# Patient Record
Sex: MALE | Race: WHITE | NOT HISPANIC OR LATINO | Employment: STUDENT | URBAN - METROPOLITAN AREA
[De-identification: names, ages, dates, MRNs, and addresses within clinical notes are randomized per-mention and may not be internally consistent; named-entity substitution may affect disease eponyms.]

---
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
## 2024-06-14 ENCOUNTER — TELEPHONE (OUTPATIENT)
Dept: INTERNAL MEDICINE | Facility: CLINIC | Age: 19
End: 2024-06-14
Payer: COMMERCIAL

## 2024-06-14 NOTE — TELEPHONE ENCOUNTER
Left Voicemail with Family Member (1st Attempt) for the patient to call back and schedule the following:    Appointment type: EPP  Provider: PCP  Return date: 12/15 or later    Additonal Notes: LVM for caregiver

## 2024-06-19 ENCOUNTER — TELEPHONE (OUTPATIENT)
Dept: INTERNAL MEDICINE | Facility: CLINIC | Age: 19
End: 2024-06-19
Payer: COMMERCIAL

## 2024-06-19 NOTE — TELEPHONE ENCOUNTER
Left Voicemail with Family Member (2nd Attempt) for the patient to call back and schedule the following:    Appointment type: EPP  Provider: PCP  Return date: 12/15 or later    Additonal Notes: LVM for caregiver

## 2024-10-08 ENCOUNTER — OFFICE VISIT (OUTPATIENT)
Dept: INTERNAL MEDICINE | Facility: CLINIC | Age: 19
End: 2024-10-08
Payer: COMMERCIAL

## 2024-10-08 VITALS
SYSTOLIC BLOOD PRESSURE: 121 MMHG | BODY MASS INDEX: 20.37 KG/M2 | HEART RATE: 81 BPM | OXYGEN SATURATION: 97 % | DIASTOLIC BLOOD PRESSURE: 80 MMHG | WEIGHT: 149.1 LBS

## 2024-10-08 DIAGNOSIS — Z00.00 ENCOUNTER FOR PREVENTIVE CARE: ICD-10-CM

## 2024-10-08 DIAGNOSIS — R17 TOTAL BILIRUBIN, ELEVATED: ICD-10-CM

## 2024-10-08 DIAGNOSIS — F41.9 ANXIETY: Primary | ICD-10-CM

## 2024-10-08 DIAGNOSIS — B07.0 PLANTAR WARTS: ICD-10-CM

## 2024-10-08 PROCEDURE — 99204 OFFICE O/P NEW MOD 45 MIN: CPT | Performed by: NURSE PRACTITIONER

## 2024-10-08 RX ORDER — BUPROPION HYDROCHLORIDE 150 MG/1
150 TABLET ORAL EVERY MORNING
Qty: 30 TABLET | Refills: 2 | Status: SHIPPED | OUTPATIENT
Start: 2024-10-08

## 2024-10-08 ASSESSMENT — ANXIETY QUESTIONNAIRES
GAD7 TOTAL SCORE: 15
5. BEING SO RESTLESS THAT IT IS HARD TO SIT STILL: SEVERAL DAYS
4. TROUBLE RELAXING: MORE THAN HALF THE DAYS
GAD7 TOTAL SCORE: 15
8. IF YOU CHECKED OFF ANY PROBLEMS, HOW DIFFICULT HAVE THESE MADE IT FOR YOU TO DO YOUR WORK, TAKE CARE OF THINGS AT HOME, OR GET ALONG WITH OTHER PEOPLE?: VERY DIFFICULT
GAD7 TOTAL SCORE: 15
2. NOT BEING ABLE TO STOP OR CONTROL WORRYING: NEARLY EVERY DAY
3. WORRYING TOO MUCH ABOUT DIFFERENT THINGS: NEARLY EVERY DAY
IF YOU CHECKED OFF ANY PROBLEMS ON THIS QUESTIONNAIRE, HOW DIFFICULT HAVE THESE PROBLEMS MADE IT FOR YOU TO DO YOUR WORK, TAKE CARE OF THINGS AT HOME, OR GET ALONG WITH OTHER PEOPLE: VERY DIFFICULT
6. BECOMING EASILY ANNOYED OR IRRITABLE: MORE THAN HALF THE DAYS
7. FEELING AFRAID AS IF SOMETHING AWFUL MIGHT HAPPEN: SEVERAL DAYS
1. FEELING NERVOUS, ANXIOUS, OR ON EDGE: NEARLY EVERY DAY
7. FEELING AFRAID AS IF SOMETHING AWFUL MIGHT HAPPEN: SEVERAL DAYS

## 2024-10-08 ASSESSMENT — PATIENT HEALTH QUESTIONNAIRE - PHQ9
10. IF YOU CHECKED OFF ANY PROBLEMS, HOW DIFFICULT HAVE THESE PROBLEMS MADE IT FOR YOU TO DO YOUR WORK, TAKE CARE OF THINGS AT HOME, OR GET ALONG WITH OTHER PEOPLE: SOMEWHAT DIFFICULT
SUM OF ALL RESPONSES TO PHQ QUESTIONS 1-9: 9
SUM OF ALL RESPONSES TO PHQ QUESTIONS 1-9: 9

## 2024-10-08 NOTE — PROGRESS NOTES
Assessment and Plan   Mr. Huddleston is a 19 year old male with history of anxiety seen to [facility] care.      (F41.9) Anxiety  (primary encounter diagnosis)  Comment:  Severe, primarily social in nature  Plan: Adult Mental Health  Referral  Start BuPROPion (WELLBUTRIN XL) 150 MG 24 hr tablet       Instructed on use, adverse effects  Follow-up in 1 month or sooner if needed   Advised on healthy lifestyle behaviors, sleep, exercise  TSH with free T4 reflex    (Z00.00) Encounter for preventive care  Plan:  CBC with platelets,   Comprehensive metabolic panel (BMP + Alb, Alk Phos, ALT, AST, Total. Bili, TP),   Lipid panel reflex to direct LDL Fasting,   Vitamin D Deficiency    (B07.0) Plantar warts  - S/p 3 cycles of freeze/thaw with liquid nitrogen cryotherapy.  Patient was educated on potential side effects versus benefits, instructed to monitor closely in the following days for possible wounds.  - RTC as needed if not improved      Most Recent Immunizations   Administered Date(s) Administered    COVID-19 MONOVALENT 12+ (Pfizer) 12/18/2021    DTaP, Unspecified 04/22/2009    Flu, Unspecified 10/16/2008    HIB, Unspecified 04/18/2005    HepA, Unspecified 11/15/2006    HepB, Unspecified 2004    MMR 04/22/2009    Pneumococcal (PCV 7) 07/25/2005    Poliovirus, inactivated (IPV) 04/22/2009    Varicella 04/22/2009       Return to clinic/Follow-up:  As needed and with no improvement, worsening, or new symptom development.     Options for treatment and follow-up care were reviewed with the patient. He engaged in the decision making process and verbalized understanding of the options discussed and agreed with the final plan.    I spent a total of 45 minutes in the care of this pt today, including time prior to, during, and following today's office visit. This time includes reviewing the patient's chart and prior history, external notes, obtaining a history, performing an examination, interpreting test results, and  "evaluation and counseling the patient. This time also includes ordering medications or tests necessary in addition to communication to other member's of the patient's health care team. Time spent in documentation and care coordination is included.    Jocelyn Du, ANP, AGACNP, APRN, CNP  Nurse Practitioner    __________________________    Subjective   Presenting Concern:    Mr. Huddleston is a 19 year old male with history of anxiety who presents to [facility] care.    Anxiety:   Longstanding anxiety difficulties, \"as long as he can remember.\"  He's never had medication treatment and has not had a formal diagnosis. Getting therapy through [facility] student assistance office.   Feels he overthinks things, social interactions are difficult.  Worries about his future. Hard to be around people, hold conversations  No current SI/SH thoughts, though admits to having suicidal ideation last spring and summer. No suicide attempts, no mental health hospitalizations.    Plantar Wart:  R great toe plantar wart.    Located on the right big toe, approx 0.25 cm in size. Not improved with Compound W. Treated 12/14/23 liquid nitrogen cryotherapy  - S/p 3 cycles of freeze/thaw with liquid nitrogen cryotherapy.  Patient was educated on potential side effects versus benefits, instructed to monitor closely in the following days for possible wounds.  - DESTRUCT BENIGN LESION, UP TO 14  - RTC as needed if not improved    Past Medical History:  No past medical history on file.    Active Meds:  No current outpatient medications on file.     No current facility-administered medications for this visit.        Allergies:  Reviewed, refer to EMR    Relevant Social History:  Living Arrangement:  Lives with roommate  Employment:  [employer] major, doing well academically  Relationship/Children:  Girlfriend, mother, stepfather, father - all supportive  Sexual Health:  None  Diet/Nutrition:  Healthy  Supplements:  None  Caffeine:  1 cup/day  Alcohol:  " None  Tobacco/Vaping:   None  Cannabis:  None  Street Drugs:  None  Exercise/Activity:  Works out, walks, runs  Sleep:  Takes awhile, gets 8 hrs    Review of Systems - negative except as document in HPI        Objective    /80 (BP Location: Left arm, Patient Position: Sitting, Cuff Size: Adult Regular)   Pulse 81   Wt 67.6 kg (149 lb 1.6 oz)   SpO2 97%   BMI 20.37 kg/m      Physical Exam   General appearance: alert, well appearing, and in no distress  Eyes: extra-ocular movements intact, pupils equally round and reactive bilaterally, no scleral icterus  Neck: no significant adenopathy, lymphadenopathy  Respiratory: Good air movement bilaterally, lungs clear  Cardiovascular: normal rate and regular rhythm, S1 and S2 normal, no murmurs, rubs or gallops, peripheral pulses full/symmetric, no peripheral edema  GI: Abdomen soft, bowel sounds present, nondistended, nontender  Musculoskeletal: No obvious joint deformity, swelling. Normal muscle tone, normal gait  Neurological: alert, oriented, normal speech, no focal findings or movement disorder noted, neck supple without rigidity, cranial nerves II through XII grossly intact, motor and sensory grossly normal bilaterally, normal muscle tone, no tremors, strength 5/5 throughout, sensation to light touch intact  Skin: R great toe with plantar wart, 0.25 cm, non-inflamed   Psychiatric: normal mentation, affect and mood    Answers submitted by the patient for this visit:  Patient Health Questionnaire (Submitted on 10/8/2024)  If you checked off any problems, how difficult have these problems made it for you to do your work, take care of things at home, or get along with other people?: Somewhat difficult  PHQ9 TOTAL SCORE: 9  Patient Health Questionnaire (G7) (Submitted on 10/8/2024)  LEELA 7 TOTAL SCORE: 15  Depression / Anxiety Questionnaire (Submitted on 10/8/2024)  Chief Complaint: Chronic problems general questions HPI Form  Depression/Anxiety: Depression &  Anxiety  Depression & Anxiety (Submitted on 10/8/2024)  Chief Complaint: Chronic problems general questions HPI Form  Status since last visit:: medium  Anxiety since last: : bad  Other associated symptoms of depression:: Yes  Other associated symotome: : Yes  Significant life event: : relationship concerns, job concerns, other  Anxious:: Yes  Current substance use:: No  General Questionnaire (Submitted on 10/8/2024)  Chief Complaint: Chronic problems general questions HPI Form  How many days per week do you miss taking your medication?: 0

## 2024-10-08 NOTE — PROGRESS NOTES
Jodie Schrader is a 19 year old, presenting for the following health issues:       Objective    /80 (BP Location: Left arm, Patient Position: Sitting, Cuff Size: Adult Regular)   Pulse 81   Wt 67.6 kg (149 lb 1.6 oz)   SpO2 97%   BMI 20.37 kg/m    Body mass index is 20.37 kg/m .    Signed Electronically by: Jocelyn Du NP    Answers submitted by the patient for this visit:  Patient Health Questionnaire (Submitted on 10/8/2024)  If you checked off any problems, how difficult have these problems made it for you to do your work, take care of things at home, or get along with other people?: Somewhat difficult  PHQ9 TOTAL SCORE: 9  Patient Health Questionnaire (G7) (Submitted on 10/8/2024)  LEELA 7 TOTAL SCORE: 15  Depression / Anxiety Questionnaire (Submitted on 10/8/2024)  Chief Complaint: Chronic problems general questions HPI Form  Depression/Anxiety: Depression & Anxiety  Depression & Anxiety (Submitted on 10/8/2024)  Chief Complaint: Chronic problems general questions HPI Form  Status since last visit:: medium  Anxiety since last: : bad  Other associated symptoms of depression:: Yes  Other associated symotome: : Yes  Significant life event: : relationship concerns, job concerns, other  Anxious:: Yes  Current substance use:: No  General Questionnaire (Submitted on 10/8/2024)  Chief Complaint: Chronic problems general questions HPI Form  How many days per week do you miss taking your medication?: 0

## 2024-10-09 NOTE — PATIENT INSTRUCTIONS
CRISIS LINES/SERVICES  If in Immediate danger please go to the ER and/or call 9-1-1  Feeling overwhelmed and just need a supportive person to talk through a struggling time?   Toll Free [phone] or text  Support  to 62742 (hours 12 PM - 10 PM CST)  The [place] Warmline provides a [id number] approach to mental health recovery, support and wellness. Calls are answered by professionally trained Certified Peer Specialists, who have first hand experience living with a mental health condition. (hours 12 PM - 10 PM CST)    Do you feel like you might be in crisis and potentially need professional services?   National Crisis Lines:  3-452-CMEMFTU (1-947.779.1674) - www.Traxpay  8-705-905-TALK (4567) - www.suicidepreventionlifeline.org  [place]:  Crisis Text Line: Text [place] to 306584. Free support at your fingertips 24/7  People who text [place] to 521286 will be connected with a counselor. Crisis Text Line is available 24 hours a day, seven days a week.  [facility] Multilingual Crisis Line:  [phone]  [facility]:  COPE - Adult (psychiatric crisis, but cannot transport self): [phone]   COPE - Child: [phone]  Acute Psychiatric Services - [facility] (24 hour crisis walk-in and crisis line): [phone]  [address] [place] [address]  [facility] (Emergency Psychiatric Evaluation): [phone]  [facility]:  [facility] Adult Crisis Line (crisis counseling and walk-in urgent care mental health): [phone]     Adult Residential Crisis Centers:     [employer] operates two Adult Residential Crisis Centers in the area: [facility] ([place]) and [facility] ([place])  These facilities are a step below in-patient hospital care, providing a three to ten-day stay for individuals who are at a moderate but not a high risk for self-harm. The crisis centers and other [employer] programs can be reached through their central screening at  [phone].    [facility], [address], [address], [place]  [phone] (p), [phone] (f)  Intakes only between 8am-6pm    [facility] ([place]), [address], [place]  [phone] (p)  All counties with [place]. [place] w/o ins    [facility], [address], [place]  [phone] (p), [phone] (f)  All counties with ins. [place] w/o ins     Domestic Violence:  [place] Domestic Violence Crisis Line (24-hour [place] crisis line) 4-737-634-7421    Emergency Shelters    [facility] 2-1-1  [email or web address].[address] [place] [address].org/  288-025-6951 or call 2-1-1  Free and Confidential Health and Human Services Information available 24/7. Connects individuals and families directly to organizations that can help with shelter, clothing, food, financial assistance, and many other resources.    [facility] Shelter Hotline: 1-232.191.4431  Provides information about emergency shelter and transitional housing for single adults, families, and youth in the [facility] area.    [facility], [phone] eli

## 2024-10-12 ENCOUNTER — LAB (OUTPATIENT)
Dept: LAB | Facility: CLINIC | Age: 19
End: 2024-10-12
Payer: COMMERCIAL

## 2024-10-12 DIAGNOSIS — Z11.59 NEED FOR HEPATITIS C SCREENING TEST: Primary | ICD-10-CM

## 2024-10-12 DIAGNOSIS — Z00.00 ENCOUNTER FOR PREVENTIVE CARE: ICD-10-CM

## 2024-10-12 LAB
ALBUMIN SERPL BCG-MCNC: 4.8 G/DL (ref 3.5–5.2)
ALP SERPL-CCNC: 101 U/L (ref 40–150)
ALT SERPL W P-5'-P-CCNC: 22 U/L (ref 0–70)
ANION GAP SERPL CALCULATED.3IONS-SCNC: 10 MMOL/L (ref 7–15)
AST SERPL W P-5'-P-CCNC: 22 U/L (ref 0–45)
BILIRUB SERPL-MCNC: 2.4 MG/DL
BUN SERPL-MCNC: 15.3 MG/DL (ref 6–20)
CALCIUM SERPL-MCNC: 10 MG/DL (ref 8.8–10.4)
CHLORIDE SERPL-SCNC: 103 MMOL/L (ref 98–107)
CHOLEST SERPL-MCNC: 156 MG/DL
CREAT SERPL-MCNC: 1.14 MG/DL (ref 0.67–1.17)
EGFRCR SERPLBLD CKD-EPI 2021: >90 ML/MIN/1.73M2
ERYTHROCYTE [DISTWIDTH] IN BLOOD BY AUTOMATED COUNT: 11.9 % (ref 10–15)
FASTING STATUS PATIENT QL REPORTED: YES
FASTING STATUS PATIENT QL REPORTED: YES
GLUCOSE SERPL-MCNC: 91 MG/DL (ref 70–99)
HCO3 SERPL-SCNC: 29 MMOL/L (ref 22–29)
HCT VFR BLD AUTO: 47.2 % (ref 40–53)
HCV AB SERPL QL IA: NONREACTIVE
HDLC SERPL-MCNC: 58 MG/DL
HGB BLD-MCNC: 16.3 G/DL (ref 13.3–17.7)
LDLC SERPL CALC-MCNC: 87 MG/DL
MCH RBC QN AUTO: 31.1 PG (ref 26.5–33)
MCHC RBC AUTO-ENTMCNC: 34.5 G/DL (ref 31.5–36.5)
MCV RBC AUTO: 90 FL (ref 78–100)
NONHDLC SERPL-MCNC: 98 MG/DL
PLATELET # BLD AUTO: 188 10E3/UL (ref 150–450)
POTASSIUM SERPL-SCNC: 4.4 MMOL/L (ref 3.4–5.3)
PROT SERPL-MCNC: 7 G/DL (ref 6.4–8.3)
RBC # BLD AUTO: 5.24 10E6/UL (ref 4.4–5.9)
SODIUM SERPL-SCNC: 142 MMOL/L (ref 135–145)
TRIGL SERPL-MCNC: 56 MG/DL
TSH SERPL DL<=0.005 MIU/L-ACNC: 2.47 UIU/ML (ref 0.3–4.2)
VIT D+METAB SERPL-MCNC: 36 NG/ML (ref 20–50)
WBC # BLD AUTO: 5.1 10E3/UL (ref 4–11)

## 2024-10-12 PROCEDURE — 84443 ASSAY THYROID STIM HORMONE: CPT | Performed by: PATHOLOGY

## 2024-10-12 PROCEDURE — 80053 COMPREHEN METABOLIC PANEL: CPT | Performed by: PATHOLOGY

## 2024-10-12 PROCEDURE — 99000 SPECIMEN HANDLING OFFICE-LAB: CPT | Performed by: PATHOLOGY

## 2024-10-12 PROCEDURE — 85027 COMPLETE CBC AUTOMATED: CPT | Performed by: PATHOLOGY

## 2024-10-12 PROCEDURE — 36415 COLL VENOUS BLD VENIPUNCTURE: CPT | Performed by: PATHOLOGY

## 2024-10-12 PROCEDURE — 82306 VITAMIN D 25 HYDROXY: CPT | Performed by: NURSE PRACTITIONER

## 2024-10-12 PROCEDURE — 80061 LIPID PANEL: CPT | Performed by: PATHOLOGY

## 2024-10-12 PROCEDURE — 86803 HEPATITIS C AB TEST: CPT

## 2024-11-05 ENCOUNTER — OFFICE VISIT (OUTPATIENT)
Dept: INTERNAL MEDICINE | Facility: CLINIC | Age: 19
End: 2024-11-05
Payer: COMMERCIAL

## 2024-11-05 VITALS
RESPIRATION RATE: 16 BRPM | BODY MASS INDEX: 20.53 KG/M2 | HEIGHT: 72 IN | SYSTOLIC BLOOD PRESSURE: 131 MMHG | HEART RATE: 88 BPM | DIASTOLIC BLOOD PRESSURE: 75 MMHG | OXYGEN SATURATION: 95 % | WEIGHT: 151.6 LBS

## 2024-11-05 DIAGNOSIS — B07.0 PLANTAR WARTS: ICD-10-CM

## 2024-11-05 DIAGNOSIS — L70.9 ACNE, UNSPECIFIED ACNE TYPE: ICD-10-CM

## 2024-11-05 DIAGNOSIS — F41.9 ANXIETY: Primary | Chronic | ICD-10-CM

## 2024-11-05 PROCEDURE — 99214 OFFICE O/P EST MOD 30 MIN: CPT | Performed by: NURSE PRACTITIONER

## 2024-11-05 RX ORDER — CLINDAMYCIN PHOSPHATE 10 MG/G
GEL TOPICAL 2 TIMES DAILY
Qty: 30 G | Refills: 2 | Status: SHIPPED | OUTPATIENT
Start: 2024-11-05

## 2024-11-05 RX ORDER — BUPROPION HYDROCHLORIDE 150 MG/1
150 TABLET ORAL EVERY MORNING
Qty: 30 TABLET | Refills: 2 | Status: CANCELLED | OUTPATIENT
Start: 2024-11-05

## 2024-11-05 RX ORDER — DOXYCYCLINE HYCLATE 100 MG
100 TABLET ORAL DAILY
Qty: 30 TABLET | Refills: 2 | Status: SHIPPED | OUTPATIENT
Start: 2024-11-05

## 2024-11-05 RX ORDER — BUPROPION HYDROCHLORIDE 300 MG/1
300 TABLET ORAL EVERY MORNING
Qty: 90 TABLET | Refills: 3 | Status: SHIPPED | OUTPATIENT
Start: 2024-11-05

## 2024-11-05 ASSESSMENT — ANXIETY QUESTIONNAIRES
7. FEELING AFRAID AS IF SOMETHING AWFUL MIGHT HAPPEN: NOT AT ALL
2. NOT BEING ABLE TO STOP OR CONTROL WORRYING: MORE THAN HALF THE DAYS
3. WORRYING TOO MUCH ABOUT DIFFERENT THINGS: MORE THAN HALF THE DAYS
GAD7 TOTAL SCORE: 8
1. FEELING NERVOUS, ANXIOUS, OR ON EDGE: SEVERAL DAYS
4. TROUBLE RELAXING: MORE THAN HALF THE DAYS
6. BECOMING EASILY ANNOYED OR IRRITABLE: SEVERAL DAYS
7. FEELING AFRAID AS IF SOMETHING AWFUL MIGHT HAPPEN: NOT AT ALL
IF YOU CHECKED OFF ANY PROBLEMS ON THIS QUESTIONNAIRE, HOW DIFFICULT HAVE THESE PROBLEMS MADE IT FOR YOU TO DO YOUR WORK, TAKE CARE OF THINGS AT HOME, OR GET ALONG WITH OTHER PEOPLE: SOMEWHAT DIFFICULT
5. BEING SO RESTLESS THAT IT IS HARD TO SIT STILL: NOT AT ALL
GAD7 TOTAL SCORE: 8
8. IF YOU CHECKED OFF ANY PROBLEMS, HOW DIFFICULT HAVE THESE MADE IT FOR YOU TO DO YOUR WORK, TAKE CARE OF THINGS AT HOME, OR GET ALONG WITH OTHER PEOPLE?: SOMEWHAT DIFFICULT
GAD7 TOTAL SCORE: 8

## 2024-11-05 NOTE — PROGRESS NOTES
"Jodie Schrader is a 19 year old, presenting for the following health issues:  Follow Up (Follow up on anxiety medication (Bupropion) and wart treatment) and Anxiety      11/5/2024     5:03 PM   Additional Questions   Roomed by keshia zavala     History of Present Illness       Mental Health Follow-up:  Patient presents to follow-up on Anxiety.    Patient's anxiety since last visit has been:  Better  The patient is having other symptoms associated with anxiety.  Any significant life events: job concerns  Patient is feeling anxious or having panic attacks.  Patient has no concerns about alcohol or drug use.    He eats 2-3 servings of fruits and vegetables daily.He consumes 0 sweetened beverage(s) daily.He exercises with enough effort to increase his heart rate 30 to 60 minutes per day.  He exercises with enough effort to increase his heart rate 5 days per week.   He is taking medications regularly.        Objective    /75 (BP Location: Left arm, Patient Position: Sitting, Cuff Size: Adult Regular)   Pulse 88   Resp 16   Ht 1.822 m (5' 11.73\")   Wt 68.8 kg (151 lb 9.6 oz)   SpO2 95%   BMI 20.71 kg/m    Body mass index is 20.71 kg/m .    Signed Electronically by: Jocelyn Du NP    "

## 2024-11-05 NOTE — PROGRESS NOTES
Assessment and Plan   Mr. Huddleston is a 19 year old male with history of anxiety seen for follow-up.    (F41.9) Anxiety  (primary encounter diagnosis)  Comment:  Some improvement on Bupropion 150mg/day.  No safety concerns.   Plan: Increased buPROPion (WELLBUTRIN XL) to 300 MG 24 hr tablet  Counseled on healthy diet, exercise, ETOH avoidance, scheduling  appointment    (B07.0) Plantar wart  Comment:  R great toe. No evidence of inflammation/infection  Plan:  Treated with liquid nitrogen X3 with return of normal skin pigmentation    (L70.9) Acne, unspecified acne type  Plan: clindamycin (CLEOCIN-T) 1 % external gel 1-2X daily   Doxycycline hyclate (VIBRA-TABS) 100 MG tablet daily X 30 days, then as needed     Most Recent Immunizations   Administered Date(s) Administered    COVID-19 MONOVALENT 12+ (Pfizer) 12/18/2021    DTaP, Unspecified 04/22/2009    Flu, Unspecified 10/16/2008    HIB, Unspecified 04/18/2005    HepA, Unspecified 11/15/2006    HepB, Unspecified 2004    MMR 04/22/2009    Pneumococcal (PCV 7) 07/25/2005    Poliovirus, inactivated (IPV) 04/22/2009    Varicella 04/22/2009     Return to clinic/Follow-up:  As needed and with no improvement, worsening, or new symptom development.     Options for treatment and follow-up care were reviewed with the patient. He engaged in the decision making process and verbalized understanding of the options discussed and agreed with the final plan.    I spent a total of 30 minutes in the care of this pt today, including time prior to, during, and following today's office visit. This time includes reviewing the patient's chart and prior history, external notes, obtaining a history, performing an examination, interpreting test results, and evaluation and counseling the patient. This time also includes ordering medications or tests necessary in addition to communication to other member's of the patient's health care team. Time spent in documentation and care coordination  "is included.    Jocelyn Du, ANP, AGACNP, APRN, CNP  Nurse Practitioner      __________________________    Subjective   Presenting Concern:    Mr. Huddleston is a 19 year old male with history of anxiety who presents for follow-up.    Anxiety:     10/8/24 PHQ9 = 9; GAD7 = 15  Started on Bupropion 150 mg  Today GAD7 = 8  Somewhat easier to engage in social conversations  Test anxiety is improved  No panic attacks   Exercising regularly  Still needs to schedule  referral appointment     Plantar Wart:  R great toe, 2nd liquid nitrogen treatment    Acne:  Facial acne    Past Medical History:  No past medical history on file.    Active Meds:  Current Outpatient Medications   Medication Sig Dispense Refill    buPROPion (WELLBUTRIN XL) 150 MG 24 hr tablet Take 1 tablet (150 mg) by mouth every morning. 30 tablet 2     No current facility-administered medications for this visit.      Allergies:  Reviewed, refer to EMR    Review of Systems - negative except as document in HPI      Objective    /75 (BP Location: Left arm, Patient Position: Sitting, Cuff Size: Adult Regular)   Pulse 88   Resp 16   Ht 1.822 m (5' 11.73\")   Wt 68.8 kg (151 lb 9.6 oz)   SpO2 95%   BMI 20.71 kg/m      Physical Exam   General appearance: alert, well appearing, and in no distress  Mental status:  somewhat anxious, normal speech,  Respiratory: Good air movement bilaterally, lungs clear  Cardiovascular: normal rate and regular rhythm, S1 and S2 normal, no murmurs, rubs or gallops, no peripheral edema  Neurological: cranial nerves II through XII grossly intact, motor and sensory grossly normal, no tremors  Skin: normal coloration and turgor. Plantar wart R great toe.   Psychiatric: normal mentation, affect and mood    Answers submitted by the patient for this visit:  Patient Health Questionnaire (G7) (Submitted on 11/5/2024)  LEELA 7 TOTAL SCORE: 8  Depression / Anxiety Questionnaire (Submitted on 11/5/2024)  Chief Complaint: Chronic " problems general questions HPI Form  Depression/Anxiety: Anxiety  Anxiety only (Submitted on 11/5/2024)  Chief Complaint: Chronic problems general questions HPI Form  Anxiety since last: : better  Other associated symotome: : Yes  Significant life event: : job concerns  Anxious:: Yes  Current substance use:: No  General Questionnaire (Submitted on 11/5/2024)  Chief Complaint: Chronic problems general questions HPI Form  How many servings of fruits and vegetables do you eat daily?: 2-3  On average, how many sweetened beverages do you drink each day (Examples: soda, juice, sweet tea, etc.  Do NOT count diet or artificially sweetened beverages)?: 0  How many minutes a day do you exercise enough to make your heart beat faster?: 30 to 60  How many days a week do you exercise enough to make your heart beat faster?: 5  How many days per week do you miss taking your medication?: 0  Questionnaire about: Chronic problems general questions HPI Form (Submitted on 11/5/2024)  Chief Complaint: Chronic problems general questions HPI Form

## 2024-12-14 ENCOUNTER — HEALTH MAINTENANCE LETTER (OUTPATIENT)
Age: 19
End: 2024-12-14

## 2025-08-26 ENCOUNTER — IMMUNIZATION (OUTPATIENT)
Dept: PEDIATRICS | Facility: CLINIC | Age: 20
End: 2025-08-26
Payer: COMMERCIAL

## 2025-08-26 PROCEDURE — 90480 ADMN SARSCOV2 VAC 1/ONLY CMP: CPT

## 2025-08-26 PROCEDURE — 91320 SARSCV2 VAC 30MCG TRS-SUC IM: CPT
